# Patient Record
Sex: MALE | Race: WHITE | Employment: UNEMPLOYED | URBAN - METROPOLITAN AREA
[De-identification: names, ages, dates, MRNs, and addresses within clinical notes are randomized per-mention and may not be internally consistent; named-entity substitution may affect disease eponyms.]

---

## 2023-03-01 ENCOUNTER — ANESTHESIA (OUTPATIENT)
Dept: MRI IMAGING | Age: 63
End: 2023-03-01

## 2023-03-01 ENCOUNTER — ANESTHESIA EVENT (OUTPATIENT)
Dept: MRI IMAGING | Age: 63
End: 2023-03-01

## 2023-03-01 ENCOUNTER — HOSPITAL ENCOUNTER (OUTPATIENT)
Dept: MRI IMAGING | Age: 63
Discharge: HOME | End: 2023-03-03
Payer: COMMERCIAL

## 2023-03-01 VITALS
DIASTOLIC BLOOD PRESSURE: 80 MMHG | OXYGEN SATURATION: 96 % | TEMPERATURE: 97 F | SYSTOLIC BLOOD PRESSURE: 135 MMHG | RESPIRATION RATE: 16 BRPM | HEART RATE: 77 BPM

## 2023-03-01 DIAGNOSIS — M54.16 RADICULOPATHY, LUMBAR REGION: ICD-10-CM

## 2023-03-01 PROCEDURE — 2580000003 HC RX 258

## 2023-03-01 PROCEDURE — 72148 MRI LUMBAR SPINE W/O DYE: CPT

## 2023-03-01 PROCEDURE — 6360000002 HC RX W HCPCS

## 2023-03-01 RX ORDER — FENTANYL CITRATE 50 UG/ML
INJECTION, SOLUTION INTRAMUSCULAR; INTRAVENOUS PRN
Status: DISCONTINUED | OUTPATIENT
Start: 2023-03-01 | End: 2023-03-01 | Stop reason: SDUPTHER

## 2023-03-01 RX ORDER — SODIUM CHLORIDE 9 MG/ML
INJECTION, SOLUTION INTRAVENOUS CONTINUOUS PRN
Status: DISCONTINUED | OUTPATIENT
Start: 2023-03-01 | End: 2023-03-01 | Stop reason: SDUPTHER

## 2023-03-01 RX ADMIN — SODIUM CHLORIDE: 9 INJECTION, SOLUTION INTRAVENOUS at 06:55

## 2023-03-01 RX ADMIN — FENTANYL CITRATE 50 MCG: 50 INJECTION INTRAMUSCULAR; INTRAVENOUS at 07:00

## 2023-03-01 RX ADMIN — FENTANYL CITRATE 50 MCG: 50 INJECTION INTRAMUSCULAR; INTRAVENOUS at 06:57

## 2023-03-01 ASSESSMENT — LIFESTYLE VARIABLES: SMOKING_STATUS: 1

## 2023-03-01 NOTE — PROGRESS NOTES
4767 -PT to 3 bay post MRI. Report from Anesthesia. See EMAR. Vitals stable. Pt alert and oriented. Pt reports chronic lower back pain. 4930- Discharge papers reviewed with patient. Pt reports understanding. IV Removed, Vitals remain stable. Pt ambulated steadily to door without difficulty. See Flowsheets for vitals.

## 2023-03-01 NOTE — ANESTHESIA POSTPROCEDURE EVALUATION
Department of Anesthesiology  Postprocedure Note    Patient: Charan Tavera  MRN: [id number]  YOB: 1960  Date of evaluation: 3/1/2023      Procedure Summary     Date: 03/01/23 Room / Location: [address] MRI; [address] [facility] [address] Procedures; [address] Radiology    Anesthesia Start: 5225 Anesthesia Stop: 6615    Procedure: MRI LUMBAR SPINE WO CONTRAST Diagnosis: Radiculopathy, lumbar region    Scheduled Providers: DINORAH Gutiérrez - CRNA Responsible Provider: Hamida Cat DO    Anesthesia Type: MAC ASA Status: 2          Anesthesia Type: No value filed. Oneil Phase I:      Oneil Phase II:        Anesthesia Post Evaluation    Patient location during evaluation: bedside  Patient participation: complete - patient cannot participate  Level of consciousness: awake and alert  Airway patency: patent  Nausea & Vomiting: no nausea and no vomiting  Complications: no  Cardiovascular status: blood pressure returned to baseline  Respiratory status: acceptable  Hydration status: euvolemic  There was medical reason for not using a multimodal analgesia pain management approach.

## 2023-03-01 NOTE — ANESTHESIA PRE PROCEDURE
Department of Anesthesiology  Preprocedure Note       Name:  Dipesh Alejandro   Age:  63 y.o.  :  1960                                          MRN:  [id number]         Date:  3/1/2023      Surgeon: * No surgeons listed *    Procedure: MRI LUMBAR    Medications prior to admission:   Prior to Admission medications    Medication Sig Start Date End Date Taking? Authorizing Provider   naproxen (NAPROSYN) 500 MG tablet Take 1 tablet by mouth 2 times daily for 30 doses 17  Chele Klein DO   oxyCODONE-acetaminophen (PERCOCET) 5-325 MG per tablet Take 1 tablet by mouth every 4 hours as needed for Pain . 17   Chele Klein DO   traMADol (ULTRAM) 50 MG tablet Take 1 tablet by mouth daily as needed for Pain 16   Denise Feliciano DO       Current medications:    Current Outpatient Medications   Medication Sig Dispense Refill    naproxen (NAPROSYN) 500 MG tablet Take 1 tablet by mouth 2 times daily for 30 doses 30 tablet 0    oxyCODONE-acetaminophen (PERCOCET) 5-325 MG per tablet Take 1 tablet by mouth every 4 hours as needed for Pain . 12 tablet 0    traMADol (ULTRAM) 50 MG tablet Take 1 tablet by mouth daily as needed for Pain 30 tablet 0     No current facility-administered medications for this encounter.      Facility-Administered Medications Ordered in Other Encounters   Medication Dose Route Frequency Provider Last Rate Last Admin    fentaNYL (SUBLIMAZE) injection   IntraVENous PRN DINORAH Frankel CRNA   50 mcg at 23 0700    0.9 % sodium chloride infusion   IntraVENous Continuous PRN DINORAH Frankel CRNA   New Bag at 23 9736       Allergies:  No Known Allergies    Problem List:    Patient Active Problem List   Diagnosis Code    Bronchitis J40    Lateral epicondylitis (tennis elbow) M77.10       Past Medical History:        Diagnosis Date    Arthritis     bilateral hands, right wrist    Chronic back pain     Tobacco use disorder        Past Surgical History:        Procedure Laterality Date    TENDON RELEASE  09-12-11    release of extensor tendon, right elbow       Social History:    Social History     Tobacco Use    Smoking status: Every Day     Packs/day: 0.50     Years: 32.00     Pack years: 16.00     Types: Cigarettes    Smokeless tobacco: Never   Substance Use Topics    Alcohol use: Yes     Comment: on occasion                                Ready to quit: Not Answered  Counseling given: Not Answered      Vital Signs (Current): There were no vitals filed for this visit. BP Readings from Last 3 Encounters:   08/25/17 135/81   02/14/17 (!) 131/105   12/20/16 122/76       NPO Status:  > 8hrs                                                                               BMI:   Wt Readings from Last 3 Encounters:   08/25/17 162 lb (73.5 kg)   02/14/17 155 lb (70.3 kg)   12/20/16 166 lb (75.3 kg)     There is no height or weight on file to calculate BMI.    CBC:   Lab Results   Component Value Date/Time    WBC 13.7 08/25/2017 09:50 AM    RBC 4.37 08/25/2017 09:50 AM    HGB 14.6 08/25/2017 09:50 AM    HCT 42.5 08/25/2017 09:50 AM    MCV 97.3 08/25/2017 09:50 AM    RDW 12.6 08/25/2017 09:50 AM     08/25/2017 09:50 AM       CMP:   Lab Results   Component Value Date/Time     08/25/2017 09:50 AM    K 4.2 08/25/2017 09:50 AM    CL 98 08/25/2017 09:50 AM    CO2 22 08/25/2017 09:50 AM    BUN 12 08/25/2017 09:50 AM    CREATININE 0.6 08/25/2017 09:50 AM    GFRAA >60 08/25/2017 09:50 AM    LABGLOM >60 08/25/2017 09:50 AM    GLUCOSE 95 08/25/2017 09:50 AM    PROT 7.4 08/25/2017 09:50 AM    CALCIUM 9.5 08/25/2017 09:50 AM    BILITOT 0.6 08/25/2017 09:50 AM    ALKPHOS 72 08/25/2017 09:50 AM    AST 20 08/25/2017 09:50 AM    ALT 14 08/25/2017 09:50 AM       POC Tests: No results for input(s): POCGLU, POCNA, POCK, POCCL, POCBUN, POCHEMO, POCHCT in the last 72 hours.     Coags: No results found for: PROTIME, INR, APTT    HCG (If Applicable): No results found for: PREGTESTUR, PREGSERUM, HCG, HCGQUANT     ABGs: No results found for: PHART, PO2ART, WVG0XQH, GVQ2RHM, BEART, C9XQLKMG     Type & Screen (If Applicable):  No results found for: LABABO, LABRH    Drug/Infectious Status (If Applicable):  No results found for: HIV, HEPCAB    COVID-19 Screening (If Applicable): No results found for: COVID19        Anesthesia Evaluation  Patient summary reviewed and Nursing notes reviewed no history of anesthetic complications:   Airway: Mallampati: III  TM distance: >3 FB   Neck ROM: full  Mouth opening: > = 3 FB   Dental:      Comment: Pt denies any loose or chipped teeth    Pulmonary:   (+) decreased breath sounds current smoker          Patient smoked on day of surgery. Cardiovascular:  Exercise tolerance: good (>4 METS),           Rhythm: regular  Rate: normal           Beta Blocker:  Not on Beta Blocker         Neuro/Psych:   Negative Neuro/Psych ROS              GI/Hepatic/Renal: Neg GI/Hepatic/Renal ROS            Endo/Other: Negative Endo/Other ROS                    Abdominal:             Vascular: negative vascular ROS. Other Findings:           Anesthesia Plan      MAC     ASA 2     (PIV 22g L Hand)  Induction: intravenous. Anesthetic plan and risks discussed with patient (Anesthesia present for pain while laying flat. Patient prefered to not receive sedation unless it was needed and just have analgesia. ). Plan discussed with attending.                     Penelope Rodrigues DO   3/1/2023